# Patient Record
Sex: FEMALE | Race: WHITE | NOT HISPANIC OR LATINO | Employment: UNEMPLOYED | URBAN - METROPOLITAN AREA
[De-identification: names, ages, dates, MRNs, and addresses within clinical notes are randomized per-mention and may not be internally consistent; named-entity substitution may affect disease eponyms.]

---

## 2022-05-18 ENCOUNTER — LAB (OUTPATIENT)
Dept: LAB | Facility: HOSPITAL | Age: 67
End: 2022-05-18

## 2022-05-18 ENCOUNTER — OFFICE VISIT (OUTPATIENT)
Dept: ENDOCRINOLOGY | Facility: CLINIC | Age: 67
End: 2022-05-18

## 2022-05-18 VITALS
SYSTOLIC BLOOD PRESSURE: 138 MMHG | OXYGEN SATURATION: 98 % | WEIGHT: 258 LBS | HEART RATE: 52 BPM | HEIGHT: 66 IN | DIASTOLIC BLOOD PRESSURE: 78 MMHG | BODY MASS INDEX: 41.46 KG/M2

## 2022-05-18 DIAGNOSIS — E05.90 HYPERTHYROIDISM: ICD-10-CM

## 2022-05-18 DIAGNOSIS — E05.90 HYPERTHYROIDISM: Primary | ICD-10-CM

## 2022-05-18 LAB
T4 FREE SERPL-MCNC: 1.25 NG/DL (ref 0.93–1.7)
TSH SERPL DL<=0.05 MIU/L-ACNC: 1.28 UIU/ML (ref 0.27–4.2)

## 2022-05-18 PROCEDURE — 84445 ASSAY OF TSI GLOBULIN: CPT

## 2022-05-18 PROCEDURE — 99204 OFFICE O/P NEW MOD 45 MIN: CPT | Performed by: INTERNAL MEDICINE

## 2022-05-18 PROCEDURE — 84443 ASSAY THYROID STIM HORMONE: CPT

## 2022-05-18 PROCEDURE — 36415 COLL VENOUS BLD VENIPUNCTURE: CPT

## 2022-05-18 PROCEDURE — 84439 ASSAY OF FREE THYROXINE: CPT

## 2022-05-18 RX ORDER — APIXABAN 5 MG/1
5 TABLET, FILM COATED ORAL 2 TIMES DAILY
COMMUNITY
Start: 2022-04-27

## 2022-05-18 RX ORDER — LOSARTAN POTASSIUM 50 MG/1
50 TABLET ORAL 2 TIMES DAILY
COMMUNITY
Start: 2022-04-27

## 2022-05-18 RX ORDER — METHIMAZOLE 10 MG/1
10 TABLET ORAL 2 TIMES DAILY
COMMUNITY
Start: 2022-04-22

## 2022-05-18 RX ORDER — PRAVASTATIN SODIUM 40 MG
40 TABLET ORAL DAILY
COMMUNITY
Start: 2022-03-31

## 2022-05-18 RX ORDER — SOTALOL HYDROCHLORIDE 80 MG/1
80 TABLET ORAL 2 TIMES DAILY
COMMUNITY
Start: 2022-04-27

## 2022-05-18 RX ORDER — CLONIDINE HYDROCHLORIDE 0.1 MG/1
0.1 TABLET ORAL 3 TIMES DAILY
COMMUNITY
Start: 2022-04-27

## 2022-05-18 RX ORDER — LANOLIN ALCOHOL/MO/W.PET/CERES
1000 CREAM (GRAM) TOPICAL WEEKLY
COMMUNITY
Start: 2022-04-27

## 2022-05-18 NOTE — PROGRESS NOTES
"     Office Note      Date: 2022  Patient Name: Sharla Zamora  MRN: [id number]  : 1955    Chief Complaint   Patient presents with   • Hyperthyroidism       History of Present Illness:   Sharla Zamora is a 66 y.o. female who presents for Hyperthyroidism  she is seen as a new patient today  ----------------------  Her thyroid first became a problem back in early  when she developed overactive thyroid when she was on amiodarone.  She was treated with methimazole. She took the medication for 3 months (stopped the amiodarone as well.)  Then in February of this year her tsh was low again and she went back into afib.  She was put back on methimazole.  -------------------  She had labs in March and says they were improved.  They had an ultrasound done which supposedly showed a goiter - I don't have that ultrasound     Subjective      Patient was born where: [place] .  Facial radiation exposure: No.  High iodine intake: No  Family hx of thyroid disease: No.    Review of Systems:   Review of Systems   Constitutional: Positive for fatigue and unexpected weight change.   Cardiovascular: Positive for palpitations.   Endocrine: Positive for cold intolerance.   Skin:        Hair loss    Neurological: Negative for tremors.   Psychiatric/Behavioral: Positive for sleep disturbance.       The following portions of the patient's history were reviewed and updated as appropriate: allergies, current medications, past family history, past medical history, past social history, past surgical history and problem list.    Objective     Visit Vitals  /78   Pulse 52   Ht 167.6 cm (66\")   Wt 117 kg (258 lb)   SpO2 98%   BMI 41.64 kg/m²       Labs:    CBC w/DIFF  No results found for: WBC, RBC, HGB, HCT, MCV, MCH, MCHC, RDW, RDWSD, MPV, PLT, NEUTRORELPCT, LYMPHORELPCT, MONORELPCT, EOSRELPCT, BASORELPCT, AUTOIGPER, NEUTROABS, LYMPHSABS, MONOSABS, EOSABS, BASOSABS, AUTOIGNUM, NRBC    T4  No results found for: FREET4    TSH  No " results found for: TSHBASE     Physical Exam:  Physical Exam  Vitals reviewed.   Constitutional:       Appearance: Normal appearance.   HENT:      Head: Normocephalic and atraumatic.   Eyes:      Extraocular Movements: Extraocular movements intact.   Neck:      Comments: Thyroid is slightly larger than a normal thyroid   Cardiovascular:      Rate and Rhythm: Regular rhythm. Bradycardia present.   Musculoskeletal:      Comments: No tremor   Lymphadenopathy:      Cervical: No cervical adenopathy.   Skin:     General: Skin is warm.   Neurological:      Mental Status: She is alert.   Psychiatric:         Mood and Affect: Mood normal.         Behavior: Behavior normal.         Thought Content: Thought content normal.         Judgment: Judgment normal.         Assessment / Plan      Assessment & Plan:  Problem List Items Addressed This Visit        Other    Hyperthyroidism - Primary    Overview     Induced by amiodarone but has persisted following the cessation of the medication            Current Assessment & Plan     The plan is to check her levels today, along with a tsi level to make sure she does not  Have underlying graves' disease.  We will then adjust the dose of methimazole based on the results.  I anticipate treating with about a year to achieve long term remission           Relevant Medications    sotalol (BETAPACE) 80 MG tablet    methIMAzole (TAPAZOLE) 10 MG tablet    Other Relevant Orders    TSH    T4, Free    Thyroid Stimulating Immunoglobulin           Isidoro Love MD   05/18/2022

## 2022-05-18 NOTE — ASSESSMENT & PLAN NOTE
The plan is to check her levels today, along with a tsi level to make sure she does not  Have underlying graves' disease.  We will then adjust the dose of methimazole based on the results.  I anticipate treating with about a year to achieve long term remission

## 2022-05-21 LAB — TSI SER-ACNC: 0.78 IU/L (ref 0–0.55)
